# Patient Record
Sex: FEMALE | Race: WHITE | NOT HISPANIC OR LATINO | Employment: FULL TIME | URBAN - METROPOLITAN AREA
[De-identification: names, ages, dates, MRNs, and addresses within clinical notes are randomized per-mention and may not be internally consistent; named-entity substitution may affect disease eponyms.]

---

## 2023-03-13 ENCOUNTER — TRANSFERRED RECORDS (OUTPATIENT)
Dept: HEALTH INFORMATION MANAGEMENT | Facility: CLINIC | Age: 53
End: 2023-03-13

## 2023-03-31 ENCOUNTER — TRANSFERRED RECORDS (OUTPATIENT)
Dept: HEALTH INFORMATION MANAGEMENT | Facility: CLINIC | Age: 53
End: 2023-03-31

## 2023-04-10 ENCOUNTER — TRANSFERRED RECORDS (OUTPATIENT)
Dept: HEALTH INFORMATION MANAGEMENT | Facility: CLINIC | Age: 53
End: 2023-04-10

## 2023-04-20 ENCOUNTER — TRANSFERRED RECORDS (OUTPATIENT)
Dept: HEALTH INFORMATION MANAGEMENT | Facility: CLINIC | Age: 53
End: 2023-04-20

## 2023-05-04 ENCOUNTER — TRANSFERRED RECORDS (OUTPATIENT)
Dept: HEALTH INFORMATION MANAGEMENT | Facility: CLINIC | Age: 53
End: 2023-05-04

## 2023-05-09 ENCOUNTER — TRANSCRIBE ORDERS (OUTPATIENT)
Dept: OTHER | Age: 53
End: 2023-05-09

## 2023-05-09 DIAGNOSIS — K22.0 ACHALASIA: Primary | ICD-10-CM

## 2023-06-28 ENCOUNTER — VIRTUAL VISIT (OUTPATIENT)
Dept: GASTROENTEROLOGY | Facility: CLINIC | Age: 53
End: 2023-06-28
Payer: COMMERCIAL

## 2023-06-28 DIAGNOSIS — R13.19 ESOPHAGEAL DYSPHAGIA: ICD-10-CM

## 2023-06-28 DIAGNOSIS — R11.10 REGURGITATION OF FOOD: Primary | ICD-10-CM

## 2023-06-28 DIAGNOSIS — K22.0 ACHALASIA: ICD-10-CM

## 2023-06-28 PROCEDURE — 99205 OFFICE O/P NEW HI 60 MIN: CPT | Mod: VID | Performed by: INTERNAL MEDICINE

## 2023-06-28 NOTE — PROGRESS NOTES
Virtual Visit Details    Type of service:  Video Visit     Start 820  End 900    Originating Location (pt. Location): Home    Distant Location (provider location):  Off-site  Platform used for Video Visit: [facility]     Gastroenterology Visit for: Azul Saldana 1970   MRN: [id number]     Reason for Visit:  chief complaint    Referred by: No ref. provider found  /   Patient Care Team:  Daniela Marie as PCP - Nathaniel Yanes DO as MD (Gastroenterology)    History of Present Illness:   Azul Saldana is a 52 year old female with no significant past medical history who is presenting as a new patient in consultation at the request of Dr. Mesa with a chief complaint of achalasia.  ---------------------------------------------------------------  6/28/23  Azul Saldana states approximately last last summer she felt as if she had bothersome GERD symptoms. Occasional brief dysphagia that kept recurring. She went to [facility] and trialed omeprazole. IN January 2023 she began coughing at night and regurgitating what she had eaten. It had been a nightly occurrence. She went to see GI who did an endoscopy and underwent dilation at that time and underwent esophagram. In addition to regurgitation she would have hiccupping towards the end of a meal. She was eating soft foods/yogurt that would go down better. There was associated tightness.  In the last 6-8 weeks she has had less dysphagia and less regurgitation. Feels that the botox has significantly helped symptoms. Manometry was performed March 31st.     Lost approximately 30 pounds.     Has not spent time in [place] or [place].   See updated BEDQ and Eckardt score below: These patient reported outcomes are pertinent to the HPI and have personally been reviewed.    ---------------------------------------------------------------       Wt Readings from Last 5 Encounters:   No data found for Wt        Esophageal Questionnaire(s)    BEDQ Questionnaire      6/26/2023      9:16 AM   BEDQ Questionnaire: How Often Have You Had the Following?   Trouble eating solid food (meat, bread, vegetables) 1   Trouble eating soft foods (yogurt, jello, pudding) 0   Trouble swallowing liquids 1   Pain while swallowing 0   Coughing or choking while swallowing foods or liquids 1   Total Score: 3         6/26/2023     9:16 AM   BEDQ Questionnaire: Discomfort/Pain Ratings   Eating solid food (meat, bread, vegetables) 3   Eating soft foods (yogurt, jello, pudding) 1   Drinking liquid 1   Total Score: 5       Eckardt Questionnaire      6/26/2023     9:17 AM   Eckardt Questionnaire   Dysphagia 1   Regurgitation 1   Retrosternal Pain 1   Weight Loss (kg) 3   Total Score:  6       Promis 10 Questionnaire      6/26/2023     9:19 AM   PROMIS 10 FLOWSHEET DATA   In general, would you say your health is: 3   In general, would you say your quality of life is: 3   In general, how would you rate your physical health? 2   In general, how would you rate your mental health, including your mood and your ability to think? 4   In general, how would you rate your satisfaction with your social activities and relationships? 4   In general, please rate how well you carry out your usual social activities and roles. (This includes activities at home, at work and in your community, and responsibilities as a parent, child, spouse, employee, friend, etc.) 4   To what extent are you able to carry out your everyday physical activities such as walking, climbing stairs, carrying groceries, or moving a chair? 5   In the past 7 days, how often have you been bothered by emotional problems such as feeling anxious, depressed, or irritable? 2   In the past 7 days, how would you rate your fatigue on average? 2   In the past 7 days, how would you rate your pain on average, where 0 means no pain, and 10 means worst imaginable pain? 0   Mental health question re-calculation - no clinical value 4   Physical health question re-calculation -  no clinical value 4   Pain question re-calculation - no clinical value 5   Global Mental Health Score 15   Global Physical Health Score 16   PROMIS TOTAL - SUBSCORES 31           STUDIES & PROCEDURES:    EGD:   Date: 3/31/23  Impression:  Findings:  The esophagus was normal.  The exam of the stomach was otherwise normal.  The examined duodenum was normal.  Fluid and foam were present throughout the lower and middile third of   the esophagus. This was removed by suction.  The lumen diameter was mildly increased in the distal third of the   esophagus.  A hypertonic lower esophageal sphincter was found. Area was successfully   injected with 100 units botulinum toxin - 25 units per quadrant at the   GE junction. Estimated blood loss: none.    Impression:  - Fluid in the lower third of the esophagus.  - Hypertonic lower esophageal sphincter. Injected with botulinum toxin.  - No specimens collected.    Pathology Report:    Colonoscopy:  Date:  Impression:  Pathology Report:     EndoFLIP directed at the UES or LES (8cm (EF-325) balloon length or 16cm (EF-322) balloon length):   Date:  8cm balloon  Balloon inflation Balloon pressure CSA (mm^2) DI (mm^2/mmHg) Dmin (mm) Compliance   20 (ladmark ID)        30        40        50           16cm balloon  Balloon inflation Balloon pressure CSA (mm^2) DI (mm^2/mmHg) Dmin (mm) Compliance   30 (ladmark ID)        40        50        60        70           High Resolution Manometry:  Date: 3/31/23  Impression:  Findings:  1. Upper esophageal sphincter resting pressure: 134.7 mm Hg (normal   ).  2. Upper esophageal sphincter residual pressure: 20.6 mm Hg (normal   <12.0).  3. Proximal esophagus average contraction pressure: 12.6 mm Hg (normal   ).  4. Distal esophagus average contraction pressure: 15.2 mm Hg (normal   ).  5. Esophageal peristalsis: Absent in every swallow.  6. Esophageal clearance by impedance: 100% incomplete bolus clearance.  7. Lower esophageal  sphincter resting pressure: 57.4 mm Hg (normal   13-43).  8. Lower esophageal sphincter residual pressure: 28.8 mm Hg (normal   <15.0).  9. No hiatal hernia.    Impression:  - Manometric findings are consistent with type 2 achalasia.     PH/Impedance:  Date:  Impression:     Bravo:  48 or 96hr  Date:  Impression:    CT:  Date: 4/20/23  Impression:  IMPRESSION:   1.  Mild dilation of the mid to distal esophagus which is filled with fluid, debris and contrast compatible with given history of achalasia. No obstructing mass is visible by CT evaluation.       Esophagram:  Date: 5/4/23  Impression:  FINDINGS:   Double contrast esophagram was performed. The esophagus remains dilated similar to prior. There is delayed passage of the barium column through the GE junction. There is interval improvement of the caliber at the GE junction.     IMPRESSION:   1.  Interval improvement of the caliber at the GE junction status post Botox injection.     3/13/23  FINDINGS:     Contrast material flows readily from the oral cavity to the gastroesophageal junction.     There is a relative obstruction at the gastroesophageal junction, and dilatation of the thoracic esophagus, with no significant peristaltic contractions noted.     After some time, and a larger quantities of contrast material, a small amount of contrast material does extend beyond the gastroesophageal junction, opacifying a small portion of the stomach. However, the majority of contrast material stayed within the distal esophagus.       IMPRESSION:   Findings compatible with esophageal achalasia.          Prior medical records were reviewed including, but not limited to, notes from referring providers, lab work, radiographic tests, and other diagnostic tests. Pertinent results were summarized above.     History   No past medical history on file.    No past surgical history on file.    Social History     Socioeconomic History     Marital status:      Spouse name: Not  on file     Number of children: Not on file     Years of education: Not on file     Highest education level: Not on file   Occupational History     Not on file   Tobacco Use     Smoking status: Never     Smokeless tobacco: Never   Vaping Use     Vaping Use: Never used   Substance and Sexual Activity     Alcohol use: Not on file     Drug use: Not on file     Sexual activity: Not on file   Other Topics Concern     Not on file   Social History Narrative     Not on file     Social Determinants of Health     Financial Resource Strain: Not on file   Food Insecurity: Not on file   Transportation Needs: Not on file   Physical Activity: Not on file   Stress: Not on file   Social Connections: Not on file   Intimate Partner Violence: Not on file   Housing Stability: Not on file       No family history on file.  Family history reviewed and edited as appropriate    Medications and Allergies:     No outpatient encounter medications on file as of 6/28/2023.     No facility-administered encounter medications on file as of 6/28/2023.        No Known Allergies     Review of systems:  A full 10 point review of systems was obtained and was negative except for the pertinent positives and negatives stated within the HPI.    Objective Findings:   Physical Exam:    Constitutional: There were no vitals taken for this visit.  General: Alert, cooperative, no distress, well-appearing  Head: Atraumatic, normocephalic, no obvious abnormalities   Eyes: EOMI, Sclera anicteric, no obvious conjunctival hemorrhage   Nose: Nares normal, no obvious malformation, no obvious rhinorrhea   Respiratory: normal appearing respirations, no cough  Musculoskeletal: Range of motion intact, no obvious strength deficit  Skin: No jaundice, no obvious rash  Neurologic: AAOx3, no obvious neurologic abnormality  Psychiatric: Normal Affect, appropriate mood  Extremities: No obvious edema, no obvious malformation     Labs, Radiology, Pathology     No results found for:  WBC, HGB, PLT, CHOL, TRIG, HDL, LDLDIRECT, ALT, AST, NA, CREATININE, BUN, CO2, TSH, INR, GLUF     Liver Function Studies - No results for input(s): PROTTOTAL, ALBUMIN, BILITOTAL, ALKPHOS, AST, ALT, BILIDIRECT in the last 08456 hours.       Patient Active Problem List    Diagnosis Date Noted     Achalasia 06/28/2023     Priority: Medium     Regurgitation of food 06/28/2023     Priority: Medium     Esophageal dysphagia 06/28/2023     Priority: Medium      Assessment and Plan   Assessment:    Azul Saldana is a 52 year old female with no significant past medical history who is presenting as a new patient in consultation at the request of Dr. Mesa with a chief complaint of achalasia.      The pathophysiology of achalasia was explained as were the subtypes (I, II, and III). The evaluation with high resolution esophageal manometry was described as was the possible need for endoFLIP. EndoFLIP is a balloon distention provocation study that can assess the secondary peristalsis in response to the distention of the balloon with liquid. It also provides information on the distensibility, diameter, cross sectional area, and pressure across the esophagogastric junction (EGJ). Occasionally, and if not already done so, a timed barium esophagram with a barium tablet can be performed to help provide additional information regarding the esophagus and EGJ.    The different treatment options were explained in detail including botulinum toxin injection, pneumatic dilation, hydrostatic dilation (EsoFLIP), laparoscopic heller myotomy (LHM), and per-oral endoscopic myotomy (POEM). Depending on the type of achalasia, one therapy may be preferred over another. This was explained to the patient to help them in deciding upon a therapy.    If the patient has spent time in [place] or [place] a clinical decision will be made as to their risk of exposure to Trypanosoma cruzi. T. cruzi can cause a condition called Chagas Disease of which  achalasia is one of the findings.      All questions about achalasia from pathophysiology through treatment were discussed and answered to the satisfaction of the patient. A helpful link describing POEM: [email or web address] was provided.     Next steps for Azulambar Ojeda Shana in the work up are I will communicate with Dr. Arrdeondo in order to begin the process to get her set up for peroral endoscopic myotomy.  She is also interested in having a video consultation with him to answer any additional questions regarding POEM.     The patient has no exposure to Trypanosoma Farooq I therefore testing is not required.  I was unable to review the actual barium images nor the actual swallows from the high-resolution esophageal manometry.  Per the report she has type II achalasia.  There is no comment on pan esophageal pressurization however the management for type I or type II achalasia would be similar.  After discussing all therapies the patient is interested in pursuing POEM. Consider endoflip 325 pre/post procedure.        1. Regurgitation of food    2. Achalasia    3. Esophageal dysphagia       No orders of the defined types were placed in this encounter.         Plan:  1. Possible treatment options for achalasia include: botulinum toxin injection, pneumatic dilation, hydrostatic dilation (EsoFLIP), laparoscopic heller myotomy (LHM), and per-oral endoscopic myotomy (POEM). Here is a helpful link describing POEM: [email or web address]. Please take your time to review these possible treatment options.   2. I will send a message to Dr. Arredondo to discuss POEM as well as begin the approval process to get scheduled  3. If you are able to get the barium images sent to our radiology that may be helpful as well as the high resolution manometry images to me directly.     Follow up plan:   Return to clinic as needed.    The risks and benefits of my recommendations, as well as other treatment options were discussed with the  patient and any available family today. All questions were answered.     o Follow up: As planned above. Today, I personally spent 40 minutes in direct face to face time with the patient, of which greater than 50% of the time was spent in patient education and counseling as described above. Approximately 13 minutes were spent on indirect care associated with the patient's consultation including but not limited to review of: patient medical records to date, clinic visits, hospital records, lab results, imaging studies, procedural documentation, and coordinating care with other providers. The findings from this review are summarized in the above note. All of the above accounted for a cumulative time of 53 minutes and was performed on the date of service.     The patient verbalized understanding of the plan and was appreciative for the time spent and information provided during the office visit.     Author:   Nathaniel Hoover DO   of Medicine  Director, Esophageal Disorders Program  Division of Gastroenterology, Hepatology, and Nutrition  [facility]    Dr. Hoover speaks for [employer] regarding dupilumab and has participated in advisory boards for the medication. He receives income for these activities. When discussing the medication, patients were informed of Dr. Hoover's role and potential conflict of interest. All questions and/or concerns were answered during the encounter.     Documentation assisted by voice recognition and documentation system.

## 2023-06-28 NOTE — NURSING NOTE
Is the patient currently in the state of [place]? YES    Visit mode:VIDEO    If the visit is dropped, the patient can be reconnected by: VIDEO VISIT: Text to cell phone: [phone]    Will anyone else be joining the visit? NO      How would you like to obtain your AVS? MyChart    Are changes needed to the allergy or medication list? NO    Reason for visit: Consult

## 2023-06-28 NOTE — PATIENT INSTRUCTIONS
It was a pleasure taking care of you today.  I've included a brief summary of our discussion and care plan from today's visit below.  Please review this information with your primary care provider.  _______________________________________________________________________    My recommendations are summarized as follows:    Possible treatment options for achalasia include: botulinum toxin injection, pneumatic dilation, hydrostatic dilation (EsoFLIP), laparoscopic heller myotomy (LHM), and per-oral endoscopic myotomy (POEM). Here is a helpful link describing POEM: https://[email or web address].[email or web address]. Please take your time to review these possible treatment options.   I will send a message to Dr. Arredondo to discuss POEM as well as begin the approval process to get scheduled  If you are able to get the barium images sent to our radiology that may be helpful as well as the high resolution manometry images to me directly.       To schedule endoscopic procedures you may call: [phone]  To schedule radiology tests you may call: [phone]  To schedule an ENT appointment you may call: [phone]    Please call my nurse care coordinator Karuna ([phone]) or Luisa ([phone]), with any questions or concerns.  If you were seen through the [facility] please feel free to reach out to Maria A at [phone]   --    Return to GI Clinic as needed   _______________________________________________________________________    Who do I call with any questions after my visit?  Please be in touch if there are any further questions that arise following today's visit.  There are multiple ways to contact your gastroenterology care team.      During business hours, you may reach a Gastroenterology nurse at [phone] and choose option 3.       To schedule or reschedule an appointment, please call [phone].     You can always send a secure message through [employer]ADEUM.  [employer]ADEUM messages are answered by your nurse or doctor typically  within 24 hours.  Please allow extra time on weekends and holidays.      For urgent/emergent questions after business hours, you may reach the on-call GI Fellow by contacting the [facility]  at [phone].     How will I get the results of any tests ordered?    You will receive all of your results.  If you have signed up for [employer], any tests ordered at your visit will be available to you after your physician reviews them.  Typically this takes 1-2 weeks.  If there are urgent results that require a change in your care plan, your physician or nurse will call you to discuss the next steps.      What is [employer]?  [employer] is a secure way for you to access all of your healthcare records from the [facility].  It is a web based computer program, so you can sign on to it from any location.  It also allows you to send secure messages to your care team.  I recommend signing up for [employer] access if you have not already done so and are comfortable with using a computer.      How to I schedule a follow-up visit?  If you did not schedule a follow-up visit today, please call [phone] to schedule a follow-up office visit.      If you feel you received exceptional care and are interested in supporting the clinical and research goals of Dr. Hoover or the Division of Gastroenterology, Hepatology, and Nutrition please contact him directly through [employer] or [email or web address] from the [facility] to discuss opportunities to donate.    Sincerely,    Nathaniel Hoover DO   of Medicine  Director, Esophageal Disorders Program  Division of Gastroenterology, Hepatology, and Nutrition  [facility]

## 2023-06-29 ENCOUNTER — PATIENT OUTREACH (OUTPATIENT)
Dept: GASTROENTEROLOGY | Facility: CLINIC | Age: 53
End: 2023-06-29
Payer: COMMERCIAL

## 2023-06-29 ENCOUNTER — CARE COORDINATION (OUTPATIENT)
Dept: GASTROENTEROLOGY | Facility: CLINIC | Age: 53
End: 2023-06-29
Payer: COMMERCIAL

## 2023-06-29 ENCOUNTER — TELEPHONE (OUTPATIENT)
Dept: GASTROENTEROLOGY | Facility: CLINIC | Age: 53
End: 2023-06-29
Payer: COMMERCIAL

## 2023-06-29 ENCOUNTER — PREP FOR PROCEDURE (OUTPATIENT)
Dept: GASTROENTEROLOGY | Facility: CLINIC | Age: 53
End: 2023-06-29
Payer: COMMERCIAL

## 2023-06-29 DIAGNOSIS — K22.0 ACHALASIA: ICD-10-CM

## 2023-06-29 DIAGNOSIS — R13.10 DYSPHAGIA: Primary | ICD-10-CM

## 2023-06-29 NOTE — TELEPHONE ENCOUNTER
Rec'd voicemail from pt requesting fax number and address to send medical records.     Currently records are being sent and images pushed into our PACs system.    Left message for pt with our fax number and sent MyChart with our fax number and our address, also notified pt we are working to get the images pushed into our system and records sent over.

## 2023-06-29 NOTE — TELEPHONE ENCOUNTER
Called pt to discuss referral from Dr Hoover, reviewed by Dr Arredondo with the following recommendations:       clinic visit and then EndoFLIP with POEM    Procedure/Imaging/Clinic: EGD with endoflip and POEM   Physician: Arnulfo   Timing: next avail   Scope time needed: 45 min   Anesthesia: Gen   Dx: dysphagia, probable achalasia   Tier: 3   Location: [facility] OR   Header of letter for pt communication: upper endoscopy with endoflip study and proceed with POEM procedure        Pt in agreement with next available clinic date of 10/18 at 7am.  Discussed procedure date of Nov 6th, pt in agreement.    Explained they may be admitted overnight but also could like discharge same day if Dr Arredondo deems her a good candidate. She understands she will need a , someone to stay with them for 24 hours and should stay in town for 24 hours (within 45 min of Hospital) post procedure    Patient needs to get pre-op physical completed. If outside  health system will need physical faxed to number [phone]   If you do not get a preop physical, your procedure could be cancelled, patient voiced understanding*    Preop Plan: Daniela Marie, PCP, pt will make appt within 30 days of procedure    Does patient have any history of gastric bypass/gastric surgery/altered panc/bili anatomy?none    Does patient have [employer] insurance?: no    Med Review    Blood thinner -  none  ASA - none  Diabetic - none  Any meds by injection or mouth for weight loss or diabetes- none    Patient Education r/t procedure: mychart    A pre-op nurse will call 1-2 days prior to the procedure.    NPO/Prep:   Briefly discussed liquid diet for 24 hours prior to procedure, also strict NPO 8 hours prior to procedure    Verbalized understanding of all instructions. All questions answered.     Procedure order placed, message routed to OR      Laurita Tom, RN, BSN,   [facility]  Care coordinator

## 2023-06-29 NOTE — PROGRESS NOTES
Reached out to [facility] in [place] regarding imaging (esophagram) and detailed, original result (esophageal manometry)     Medical records will push images into PACs and send request for original manometry tracing to endoscopy dept.

## 2023-06-30 ENCOUNTER — HOSPITAL ENCOUNTER (INPATIENT)
Facility: CLINIC | Age: 53
Setting detail: SURGERY ADMIT
End: 2023-06-30
Attending: INTERNAL MEDICINE | Admitting: INTERNAL MEDICINE
Payer: COMMERCIAL

## 2023-07-11 ENCOUNTER — TELEPHONE (OUTPATIENT)
Dept: GASTROENTEROLOGY | Facility: CLINIC | Age: 53
End: 2023-07-11
Payer: COMMERCIAL

## 2023-07-11 NOTE — TELEPHONE ENCOUNTER
----- Message from Karuna Mackenzie RN sent at 7/11/2023  8:37 AM CDT -----  Regarding: RE: Esophagram images and Manometry original tracings  Yes, all the tests have been done at [facility] in [place]. The Esophagram was done on 5/4/23, if you can also get the ones form 3/3/23 that would be awesome.    Manometry was done on 3/31/23    Thank you!  ----- Message -----  From: Honey Reyna LPN  Sent: 7/10/2023   3:37 PM CDT  To: Karuna Mackenzie RN  Subject: RE: Esophagram images and Manometry original#    Is it all at [place]?    Honey Reyna LPN    ----- Message -----  From: Karuna Mackenzie RN  Sent: 7/7/2023  11:59 AM CDT  To: Karuna Mackenzie RN; #  Subject: Esophagram images and Manometry original tra#    Hello,    Can you help follow up on the esophagram images and manometry original tracings (will need to be mailed so we have them in color vs fax)?    Luisa called previously about having the esophagram images pushed into our PACs system and mailing out manometry tracings but I don't see them. Could you please follow up with [facility] on this as well? Pt is going to try and help us as well.    Thanks,  Karuna

## 2023-07-11 NOTE — TELEPHONE ENCOUNTER
Faxed requested to [place] for records to be mailed in color.     Got records but not in color. Send another request for color.    No records. Called and left message along with writers return call to writer when they have been mailed.     Got colored records. Sent to HIM to scan into patient chart.    Got a call back from Marcia at [facility] and wanted to call back as she got message.    Spoke to Marcia and informed did get colored records. Just need images pushed to PAC's. She was going to push them.    Images pushed and resolved.

## 2023-08-13 ENCOUNTER — HEALTH MAINTENANCE LETTER (OUTPATIENT)
Age: 53
End: 2023-08-13

## 2023-09-26 ENCOUNTER — PATIENT OUTREACH (OUTPATIENT)
Dept: GASTROENTEROLOGY | Facility: CLINIC | Age: 53
End: 2023-09-26
Payer: COMMERCIAL

## 2023-09-26 ENCOUNTER — TELEPHONE (OUTPATIENT)
Dept: GASTROENTEROLOGY | Facility: CLINIC | Age: 53
End: 2023-09-26
Payer: COMMERCIAL

## 2023-09-26 NOTE — TELEPHONE ENCOUNTER
Writer contacted patient to reschedule     Patient scheduled on 12/15.     Following details were discussed:  Will need a , someone to stay with them for 24 hours and should stay in town for 24 hours (within 45 min of Hospital) post procedure  Needs to get pre-op physical completed. If outside  health system will need physical faxed to number [phone]   Will be receiving phone call from [facility] nurses to discuss arrival times etc...     Was patient in agreement: Yes  Any questions/concerns?:  Will reschedule clinic appointment.         ----- Message from Laurita Tom RN sent at 9/26/2023  9:11 AM CDT -----  Regarding: RE: cancel her procedure    Thank you Dori. Erin would you please call the patient back and ask if she would like to reschedule? This is for a POEM procedure, of which I see she also cancelled her upcoming clinic consultation with Dr Arredondo to discuss it. That would need to be rescheduled as well if she wants to proceed with procedure.    Shannon Perry    ----- Message -----  From: Dori Hatfield  Sent: 9/26/2023   8:57 AM CDT  To: Laurita Tom RN  Subject: cancel her procedure                             Ankit Perry,    She got transferred to us to cancel her procedure with  on 11/6/23. She didn't say why and mentioned she will call back to reschedule. I had a really angie hearing her as the call was in and out and a bad connection.     I told her I would just send a message to the team as we don't cancel those appointments in regular endoscopy scheduling.     Thank youDori

## 2023-09-26 NOTE — TELEPHONE ENCOUNTER
Called pt in response to scheduling message that she needed to reschedule her POEM procedure with Dr Arredondo. Pt had initial consultation arranged for 10/18 which she cancelled on mychart.  Rescheduled procedure for 12/15 done by OR . Rescheduled initial clinic consultation for 12/6 and scheduled follow up 1 mo visit on 1/17/24. Message routed to clinic coordinators.    Laurita Tom, RN, BSN,   [facility]  Care coordinator

## 2023-12-31 ENCOUNTER — HEALTH MAINTENANCE LETTER (OUTPATIENT)
Age: 53
End: 2023-12-31

## 2024-12-15 ENCOUNTER — HEALTH MAINTENANCE LETTER (OUTPATIENT)
Age: 54
End: 2024-12-15

## 2025-01-19 ENCOUNTER — HEALTH MAINTENANCE LETTER (OUTPATIENT)
Age: 55
End: 2025-01-19